# Patient Record
Sex: FEMALE | Race: OTHER | URBAN - METROPOLITAN AREA
[De-identification: names, ages, dates, MRNs, and addresses within clinical notes are randomized per-mention and may not be internally consistent; named-entity substitution may affect disease eponyms.]

---

## 2019-08-28 ENCOUNTER — APPOINTMENT (RX ONLY)
Dept: URBAN - METROPOLITAN AREA CLINIC 45 | Facility: CLINIC | Age: 64
Setting detail: DERMATOLOGY
End: 2019-08-28

## 2019-08-28 DIAGNOSIS — L71.8 OTHER ROSACEA: ICD-10-CM | Status: INADEQUATELY CONTROLLED

## 2019-08-28 PROCEDURE — ? PRESCRIPTION

## 2019-08-28 PROCEDURE — ? TREATMENT REGIMEN

## 2019-08-28 PROCEDURE — ? COUNSELING

## 2019-08-28 PROCEDURE — 99202 OFFICE O/P NEW SF 15 MIN: CPT

## 2019-08-28 PROCEDURE — ? INVENTORY

## 2019-08-28 RX ORDER — IVERMECTIN 10 MG/G
CREAM TOPICAL QAM
Qty: 1 | Refills: 3 | Status: ERX | COMMUNITY
Start: 2019-08-28

## 2019-08-28 RX ADMIN — IVERMECTIN: 10 CREAM TOPICAL at 14:38

## 2019-08-28 ASSESSMENT — LOCATION SIMPLE DESCRIPTION DERM
LOCATION SIMPLE: LEFT CHEEK
LOCATION SIMPLE: RIGHT CHEEK

## 2019-08-28 ASSESSMENT — LOCATION DETAILED DESCRIPTION DERM
LOCATION DETAILED: RIGHT CENTRAL MALAR CHEEK
LOCATION DETAILED: LEFT CENTRAL MALAR CHEEK

## 2019-08-28 ASSESSMENT — LOCATION ZONE DERM: LOCATION ZONE: FACE

## 2019-08-28 NOTE — HPI: RASH
What Type Of Note Output Would You Prefer (Optional)?: Bullet Format
How Severe Is Your Rash?: mild
Is This A New Presentation, Or A Follow-Up?: Rash
Additional History: New patient.

## 2019-08-28 NOTE — PROCEDURE: TREATMENT REGIMEN
Otc Regimen: Cerave hydrating cleanser QD, Aveeno 24hr skin relief moisturizer or Neutrogena hydroboost body gel (in a pump) for the face QD, Onofre 10% sulfur ointment for the face QHS.
Detail Level: Zone

## 2019-09-03 ENCOUNTER — RX ONLY (OUTPATIENT)
Age: 64
Setting detail: RX ONLY
End: 2019-09-03

## 2019-09-03 RX ORDER — METRONIDAZOLE 7.5 MG/G
1 CREAM TOPICAL QDAY
Qty: 1 | Refills: 3 | Status: ERX | COMMUNITY
Start: 2019-09-03

## 2020-06-03 ENCOUNTER — TELEPHONE ENCOUNTER (OUTPATIENT)
Dept: URBAN - METROPOLITAN AREA SURGERY CENTER 13 | Facility: SURGERY CENTER | Age: 65
End: 2020-06-03

## 2020-06-09 ENCOUNTER — OFFICE VISIT (OUTPATIENT)
Dept: URBAN - METROPOLITAN AREA MEDICAL CENTER 24 | Facility: MEDICAL CENTER | Age: 65
End: 2020-06-09
Payer: MEDICARE

## 2020-06-09 DIAGNOSIS — I85.00 ESOPHAGEAL  VARICOSE VEINS: ICD-10-CM

## 2020-06-09 DIAGNOSIS — K29.60 ADENOPAPILLOMATOSIS GASTRICA: ICD-10-CM

## 2020-06-09 PROCEDURE — 43244 EGD VARICES LIGATION: CPT | Performed by: INTERNAL MEDICINE

## 2020-07-06 ENCOUNTER — LAB OUTSIDE AN ENCOUNTER (OUTPATIENT)
Dept: URBAN - METROPOLITAN AREA CLINIC 115 | Facility: CLINIC | Age: 65
End: 2020-07-06

## 2020-07-06 ENCOUNTER — OFFICE VISIT (OUTPATIENT)
Dept: URBAN - METROPOLITAN AREA CLINIC 115 | Facility: CLINIC | Age: 65
End: 2020-07-06
Payer: MEDICARE

## 2020-07-06 DIAGNOSIS — K74.60 UNSPECIFIED CIRRHOSIS OF LIVER: ICD-10-CM

## 2020-07-06 DIAGNOSIS — I85.10 SECONDARY ESOPHAGEAL VARICES WITHOUT BLEEDING: ICD-10-CM

## 2020-07-06 DIAGNOSIS — R18.8 OTHER ASCITES: ICD-10-CM

## 2020-07-06 DIAGNOSIS — R10.84 GENERALIZED ABDOMINAL PAIN: ICD-10-CM

## 2020-07-06 PROBLEM — 19943007: Status: ACTIVE | Noted: 2020-07-06

## 2020-07-06 PROBLEM — 389026000: Status: ACTIVE | Noted: 2020-07-06

## 2020-07-06 PROBLEM — 14223005: Status: ACTIVE | Noted: 2020-07-06

## 2020-07-06 PROCEDURE — 1036F TOBACCO NON-USER: CPT | Performed by: INTERNAL MEDICINE

## 2020-07-06 PROCEDURE — G8417 CALC BMI ABV UP PARAM F/U: HCPCS | Performed by: INTERNAL MEDICINE

## 2020-07-06 PROCEDURE — 99214 OFFICE O/P EST MOD 30 MIN: CPT | Performed by: INTERNAL MEDICINE

## 2020-07-06 RX ORDER — ACETAMINOPHEN ER 650 MG TABLET,EXTENDED RELEASE 650 MG
TAKE 2 TABLETS (1,300 MG) BY ORAL ROUTE EVERY 8 HOURS SWALLOWING WHOLE WITH WATER. DO NOT BREAK, CRUSH, DISSOLVE AND/OR CHEW TABLET, EXTENDED RELEASE ORAL
Qty: 0 | Refills: 0 | Status: ACTIVE | COMMUNITY
Start: 1900-01-01

## 2020-07-06 RX ORDER — DICYCLOMINE HYDROCHLORIDE 10 MG/1
1 TABLET CAPSULE ORAL THREE TIMES A DAY
Qty: 90 | Refills: 1 | OUTPATIENT
Start: 2020-07-06 | End: 2020-09-04

## 2020-07-06 RX ORDER — PANTOPRAZOLE SODIUM 40 MG/1
TAKE 1 TABLET (40 MG) BY ORAL ROUTE ONCE DAILY TABLET, DELAYED RELEASE ORAL 1
Qty: 0 | Refills: 0 | Status: ACTIVE | COMMUNITY
Start: 1900-01-01

## 2020-07-06 RX ORDER — ATORVASTATIN CALCIUM 20 MG/1
TABLET, FILM COATED ORAL
Qty: 0 | Refills: 0 | Status: ACTIVE | COMMUNITY
Start: 1900-01-01

## 2020-07-06 RX ORDER — LOSARTAN POTASSIUM 25 MG/1
TAKE 1 TABLET (25 MG) BY ORAL ROUTE ONCE DAILY TABLET, FILM COATED ORAL 1
Qty: 0 | Refills: 0 | Status: ACTIVE | COMMUNITY
Start: 1900-01-01

## 2020-07-06 RX ORDER — AMLODIPINE BESYLATE 10 MG/1
TABLET ORAL
Qty: 0 | Refills: 0 | Status: ACTIVE | COMMUNITY
Start: 1900-01-01

## 2020-07-06 RX ORDER — SPIRONOLACTONE 50 MG/1
TAKE 1 TABLET (50 MG) BY ORAL ROUTE ONCE DAILY TABLET, FILM COATED ORAL 1
Qty: 0 | Refills: 0 | Status: ACTIVE | COMMUNITY
Start: 1900-01-01

## 2020-07-06 RX ORDER — FUROSEMIDE 20 MG/1
TABLET ORAL
Qty: 0 | Refills: 0 | Status: ACTIVE | COMMUNITY
Start: 1900-01-01

## 2020-07-06 RX ORDER — PANTOPRAZOLE SODIUM 40 MG
TAKE 1 TABLET (40 MG) BY ORAL ROUTE ONCE DAILY FOR 90 DAYS TABLET, DELAYED RELEASE (ENTERIC COATED) ORAL 1
Qty: 90 | Refills: 1 | Status: ACTIVE | COMMUNITY
Start: 2020-03-09 | End: 2020-09-05

## 2020-07-06 NOTE — HPI-TODAY'S VISIT:
65 years old pleasant white female came for follow-up patient is complaining of abdominal pain which is diffuse all over the abdomen.  Denies of any melena hematochezia she is not on any dicyclomine denies of any further diarrhea no fever no chills denies of any leg swelling.  She was also worried about celiac disease.  She was on Lasix and Aldactone currently.  No jaundice no ascites.  She did had a EGD and banding done on 2/27/2020 showed grade 2 esophageal varices which are in 2 columns with portal gastropathy.

## 2020-07-10 LAB
A/G RATIO: 1
ALBUMIN: 3.6
ALKALINE PHOSPHATASE: 161
ALT (SGPT): 58
AST (SGOT): 80
BILIRUBIN, TOTAL: 1
BUN/CREATININE RATIO: 12
BUN: 11
CALCIUM: 9.6
CARBON DIOXIDE, TOTAL: 23
CHLORIDE: 105
CREATININE: 0.95
EGFR IF AFRICN AM: 72
EGFR IF NONAFRICN AM: 63
GLOBULIN, TOTAL: 3.6
GLUCOSE: 139
HEMATOCRIT: 42.7
HEMOGLOBIN: 13.7
MCH: 32.2
MCHC: 32.1
MCV: 100
NRBC: (no result)
PLATELETS: 130
POTASSIUM: 4.8
PROTEIN, TOTAL: 7.2
RBC: 4.26
RDW: 13.8
SODIUM: 145
T-TRANSGLUTAMINASE (TTG) IGA: <2
T-TRANSGLUTAMINASE (TTG) IGG: <2
WBC: 6.2

## 2020-07-22 ENCOUNTER — LAB OUTSIDE AN ENCOUNTER (OUTPATIENT)
Dept: URBAN - METROPOLITAN AREA CLINIC 82 | Facility: CLINIC | Age: 65
End: 2020-07-22

## 2020-07-22 LAB
CREATININE POC: 0.8
PERFORMING LAB: (no result)

## 2020-07-27 ENCOUNTER — ERX REFILL RESPONSE (OUTPATIENT)
Age: 65
End: 2020-07-27

## 2020-07-27 RX ORDER — SPIRONOLACTONE 50 MG/1
TAKE 1 TABLET BY MOUTH ONCE DAILY TABLET ORAL
Qty: 90 | Refills: 1

## 2020-07-27 RX ORDER — SUCRALFATE 1 G/1
TAKE 1 TABLET BY MOUTH TWICE DAILY FOR 90 DAYS TABLET ORAL
Qty: 180 | Refills: 0

## 2020-08-12 ENCOUNTER — TELEPHONE ENCOUNTER (OUTPATIENT)
Dept: URBAN - METROPOLITAN AREA CLINIC 115 | Facility: CLINIC | Age: 65
End: 2020-08-12

## 2020-10-07 ENCOUNTER — ERX REFILL RESPONSE (OUTPATIENT)
Age: 65
End: 2020-10-07

## 2020-10-07 RX ORDER — PANTOPRAZOLE 40 MG/1
TAKE 1 TABLET BY MOUTH ONCE DAILY FOR 90 DAYS TABLET, DELAYED RELEASE ORAL
Qty: 90 | Refills: 0

## 2020-10-09 ENCOUNTER — ERX REFILL RESPONSE (OUTPATIENT)
Age: 65
End: 2020-10-09

## 2020-10-09 RX ORDER — PANTOPRAZOLE 40 MG/1
TAKE 1 TABLET BY MOUTH ONCE DAILY FOR 90 DAYS TABLET, DELAYED RELEASE ORAL
Qty: 90 | Refills: 0

## 2020-10-15 ENCOUNTER — OFFICE VISIT (OUTPATIENT)
Dept: URBAN - METROPOLITAN AREA CLINIC 115 | Facility: CLINIC | Age: 65
End: 2020-10-15

## 2020-10-15 ENCOUNTER — OFFICE VISIT (OUTPATIENT)
Dept: URBAN - METROPOLITAN AREA MEDICAL CENTER 31 | Facility: MEDICAL CENTER | Age: 65
End: 2020-10-15
Payer: MEDICARE

## 2020-10-15 DIAGNOSIS — I85.10 ESOPH VARICE OTHER DIS: ICD-10-CM

## 2020-10-15 DIAGNOSIS — K74.69 CIRRHOSIS, CRYPTOGENIC: ICD-10-CM

## 2020-10-15 DIAGNOSIS — K29.60 ADENOPAPILLOMATOSIS GASTRICA: ICD-10-CM

## 2020-10-15 PROCEDURE — 43239 EGD BIOPSY SINGLE/MULTIPLE: CPT | Performed by: INTERNAL MEDICINE

## 2020-11-12 ENCOUNTER — WEB ENCOUNTER (OUTPATIENT)
Dept: URBAN - METROPOLITAN AREA CLINIC 115 | Facility: CLINIC | Age: 65
End: 2020-11-12

## 2020-11-12 ENCOUNTER — OFFICE VISIT (OUTPATIENT)
Dept: URBAN - METROPOLITAN AREA CLINIC 115 | Facility: CLINIC | Age: 65
End: 2020-11-12
Payer: MEDICARE

## 2020-11-12 DIAGNOSIS — K74.60 CIRRHOSIS OF LIVER: ICD-10-CM

## 2020-11-12 DIAGNOSIS — I85.00 ESOPHAGEAL VARICES: ICD-10-CM

## 2020-11-12 DIAGNOSIS — R19.7 DIARRHEA: ICD-10-CM

## 2020-11-12 DIAGNOSIS — R77.2 ELEVATED AFP: ICD-10-CM

## 2020-11-12 PROCEDURE — G8427 DOCREV CUR MEDS BY ELIG CLIN: HCPCS | Performed by: INTERNAL MEDICINE

## 2020-11-12 PROCEDURE — G8417 CALC BMI ABV UP PARAM F/U: HCPCS | Performed by: INTERNAL MEDICINE

## 2020-11-12 PROCEDURE — 99214 OFFICE O/P EST MOD 30 MIN: CPT | Performed by: INTERNAL MEDICINE

## 2020-11-12 PROCEDURE — 1036F TOBACCO NON-USER: CPT | Performed by: INTERNAL MEDICINE

## 2020-11-12 RX ORDER — SUCRALFATE 1 G/1
TAKE 1 TABLET BY MOUTH TWICE DAILY FOR 90 DAYS TABLET ORAL
Qty: 180 | Refills: 0 | COMMUNITY

## 2020-11-12 RX ORDER — FUROSEMIDE 20 MG/1
TABLET ORAL
Qty: 0 | Refills: 0 | COMMUNITY
Start: 1900-01-01

## 2020-11-12 RX ORDER — PANTOPRAZOLE 40 MG/1
TAKE 1 TABLET BY MOUTH ONCE DAILY FOR 90 DAYS TABLET, DELAYED RELEASE ORAL
Qty: 90 | Refills: 0 | COMMUNITY

## 2020-11-12 RX ORDER — ACETAMINOPHEN ER 650 MG TABLET,EXTENDED RELEASE 650 MG
TAKE 2 TABLETS (1,300 MG) BY ORAL ROUTE EVERY 8 HOURS SWALLOWING WHOLE WITH WATER. DO NOT BREAK, CRUSH, DISSOLVE AND/OR CHEW TABLET, EXTENDED RELEASE ORAL
Qty: 0 | Refills: 0 | COMMUNITY
Start: 1900-01-01

## 2020-11-12 RX ORDER — ATORVASTATIN CALCIUM 20 MG/1
TABLET, FILM COATED ORAL
Qty: 0 | Refills: 0 | COMMUNITY
Start: 1900-01-01

## 2020-11-12 RX ORDER — AMLODIPINE BESYLATE 10 MG/1
TABLET ORAL
Qty: 0 | Refills: 0 | COMMUNITY
Start: 1900-01-01

## 2020-11-12 RX ORDER — SPIRONOLACTONE 50 MG/1
TAKE 1 TABLET BY MOUTH ONCE DAILY TABLET ORAL
Qty: 90 | Refills: 1 | COMMUNITY

## 2020-11-12 RX ORDER — LOSARTAN POTASSIUM 25 MG/1
TAKE 1 TABLET (25 MG) BY ORAL ROUTE ONCE DAILY TABLET, FILM COATED ORAL 1
Qty: 0 | Refills: 0 | COMMUNITY
Start: 1900-01-01

## 2020-11-12 NOTE — HPI-TODAY'S VISIT:
07/06/2020 65 years old pleasant white female came for follow-up patient is complaining of abdominal pain which is diffuse all over the abdomen.  Denies of any melena hematochezia she is not on any dicyclomine denies of any further diarrhea no fever no chills denies of any leg swelling.  She was also worried about celiac disease.  She was on Lasix and Aldactone currently.  No jaundice no ascites.  She did had a EGD and banding done on 2/27/2020 showed grade 2 esophageal varices which are in 2 columns with portal gastropathy.   11/12/2020 Patient presents for a follow up office visit. Patient has continued symptoms of diarrhea and bloating occasionally. EGD on 10/15/2020 showed grade 1 esoophageal varices and portal gastropathy. She had CT scan done this year June 2020. Denies any jaundice, ascites or pedal edema.

## 2020-11-12 NOTE — HPI-OTHER HISTORIES
The patient is a 64 year old female, who presents on referral from Jey Stoll MD, for a gastroenterology evaluation for cirrhosis of the liver. A copy of this document will be sent to the referring provider. The patient has been experiencing these symptoms for 6 months.  Diagnostic testing has not been done.  No related laboratory studies have been done recently.       6/5/2019 CT done on 5/24/2019 showed cirrhosis of the liver, esophageal varices present. Patient states this is the first time she was found to have cirrhosis. She had a previous cholecystectomy. She states she visited her PCP for diarrhea. She denies alcohol consumption. Patient's daughter has cirrhosis due to CHILDS. She is currently off of cholesterol medication. She complains of abdominal pain and soreness. She had a hepatitis panel done which was negative. She denies any melena or hematochezia.  8/14/2019 EGD done on 7/18/2019 showed esophageal varices 3 column, s/p band ligation and gastritis of the antrum of the stomach. Biopsy of the stomach did not show any H pylori bacteria. Liver biopsy done on 7/25/2019 showed cirrhosis of the liver, etiology unknown. MRI on 7/29/2019 showed cirrhosis with stigmata of portal hypertension including splenomegaly, moderate periesophageal varices and tiny mesenteric and perisplenic varices, as well as perihepatic ascites, no evidence of hepatocellular carcinoma.  Patient states she is doing better. She has occasional abdominal pain and leg swelling. She admits to seldom use of alcohol in the past. She states the banding of esophageal varices was painful. She does admit to scoliosis in the back and hips. She complains of bloating and gas pain. She does admit to diarrhea, with 6-7 loose bowels a day. Denies any melena or hematochezia.  10/30/2019 Colonoscopy on 10/4/2019 showed one 10 mm polyp in the ascending colon. 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  Patient still complains of diarrhea, with 4-8 loose bowels a day. She states she was seen by Dr. Fuchs, and was put on Nadolol. She states she did not notice any improvement on Bentyl. She has not seen [facility] for evaluation of liver transplant. Denies any pedal edema. She complains bloating. She is currently on Lasix and aldactone.  11/27/2019 EGD done on 11/7/2019 showed 3 column, grade 2 esophageal varices, s/p placement of 7 bands. Biopsy showed gastritis, no H pylori infection noted.  Patient states she is feeling better. She denies any issues after EGD. She is currently on Nadolol. She states Levsin helped her with IBS, abdominal pain improved. She has not been taking the Questran powder. She states she saw her PCP 2 weeks ago, and her blood pressure has been under control. She denies any jaundice, ascites or pedal edema.  03/18/2020 EGD w/ banding done on 02/27/2020 showed grade 2 esophageal varices in 2 columns and portal gastropathy also noted. Biopsy showed gastritis, no H pylori.  Patient states she has been having symptoms of constipation. She continues to take Nadolol 20mg. She denies any pedal edema. She does complain of stomach swelling. She denies any melena or hematochezia. She continues to take Aldactone and Lasix. Patient states she has been having trouble eating. She complains of food catching up. She has had bloating. She denies any food allergies.

## 2020-11-13 LAB
A/G RATIO: 1.2
AFP, SERUM, TUMOR MARKER: 7.8
ALBUMIN: 3.9
ALKALINE PHOSPHATASE: 229
ALT (SGPT): 39
AST (SGOT): 63
BILIRUBIN, TOTAL: 1.2
BUN/CREATININE RATIO: 12
BUN: 9
CALCIUM: 9.2
CARBON DIOXIDE, TOTAL: 24
CHLORIDE: 106
CREATININE: 0.78
EGFR IF AFRICN AM: 92
EGFR IF NONAFRICN AM: 79
GLOBULIN, TOTAL: 3.3
GLUCOSE: 97
HEMATOCRIT: 40.5
HEMOGLOBIN: 13.3
INR: 1.2
MCH: 32.3
MCHC: 32.8
MCV: 98
NRBC: (no result)
PLATELETS: 155
POTASSIUM: 4.5
PROTEIN, TOTAL: 7.2
PROTHROMBIN TIME: 13
RBC: 4.12
RDW: 13.5
SODIUM: 141
WBC: 5

## 2020-12-16 ENCOUNTER — ERX REFILL RESPONSE (OUTPATIENT)
Age: 65
End: 2020-12-16

## 2020-12-16 RX ORDER — NADOLOL 20 MG/1
TAKE 1 TABLET BY MOUTH ONCE DAILY TABLET ORAL
Qty: 30 | Refills: 0

## 2020-12-29 ENCOUNTER — TELEPHONE ENCOUNTER (OUTPATIENT)
Dept: URBAN - METROPOLITAN AREA CLINIC 115 | Facility: CLINIC | Age: 65
End: 2020-12-29

## 2021-01-26 ENCOUNTER — TELEPHONE ENCOUNTER (OUTPATIENT)
Dept: URBAN - METROPOLITAN AREA CLINIC 115 | Facility: CLINIC | Age: 66
End: 2021-01-26

## 2021-02-11 ENCOUNTER — TELEPHONE ENCOUNTER (OUTPATIENT)
Dept: URBAN - METROPOLITAN AREA CLINIC 115 | Facility: CLINIC | Age: 66
End: 2021-02-11

## 2021-02-11 ENCOUNTER — ERX REFILL RESPONSE (OUTPATIENT)
Age: 66
End: 2021-02-11

## 2021-02-11 ENCOUNTER — OFFICE VISIT (OUTPATIENT)
Dept: URBAN - METROPOLITAN AREA CLINIC 115 | Facility: CLINIC | Age: 66
End: 2021-02-11
Payer: MEDICARE

## 2021-02-11 DIAGNOSIS — R77.2 ELEVATED AFP: ICD-10-CM

## 2021-02-11 DIAGNOSIS — I85.00 ESOPHAGEAL VARICES: ICD-10-CM

## 2021-02-11 DIAGNOSIS — K74.60 CIRRHOSIS OF LIVER: ICD-10-CM

## 2021-02-11 DIAGNOSIS — R19.7 DIARRHEA: ICD-10-CM

## 2021-02-11 PROCEDURE — G9903 PT SCRN TBCO ID AS NON USER: HCPCS | Performed by: INTERNAL MEDICINE

## 2021-02-11 PROCEDURE — G8427 DOCREV CUR MEDS BY ELIG CLIN: HCPCS | Performed by: INTERNAL MEDICINE

## 2021-02-11 PROCEDURE — 99214 OFFICE O/P EST MOD 30 MIN: CPT | Performed by: INTERNAL MEDICINE

## 2021-02-11 PROCEDURE — G8417 CALC BMI ABV UP PARAM F/U: HCPCS | Performed by: INTERNAL MEDICINE

## 2021-02-11 RX ORDER — FUROSEMIDE 20 MG/1
1 TABLET TABLET ORAL ONCE A DAY
Qty: 90 | Refills: 1 | OUTPATIENT
Start: 2021-02-11

## 2021-02-11 RX ORDER — ACETAMINOPHEN ER 650 MG TABLET,EXTENDED RELEASE 650 MG
TAKE 2 TABLETS (1,300 MG) BY ORAL ROUTE EVERY 8 HOURS SWALLOWING WHOLE WITH WATER. DO NOT BREAK, CRUSH, DISSOLVE AND/OR CHEW TABLET, EXTENDED RELEASE ORAL
Qty: 0 | Refills: 0 | Status: ACTIVE | COMMUNITY
Start: 1900-01-01

## 2021-02-11 RX ORDER — HYOSCYAMINE SULFATE 0.12 MG/1
TAKE 1 TABLET BY MOUTH THREE TIMES DAILY TABLET ORAL
Qty: 75 | Refills: 0

## 2021-02-11 RX ORDER — PANTOPRAZOLE SODIUM 40 MG/1
1 TABLET TABLET, DELAYED RELEASE ORAL ONCE A DAY
Qty: 90 | Refills: 1 | OUTPATIENT
Start: 2021-02-11

## 2021-02-11 RX ORDER — SUCRALFATE 1 G/1
TAKE 1 TABLET BY MOUTH TWICE DAILY FOR 90 DAYS TABLET ORAL
Qty: 180 | Refills: 0 | Status: DISCONTINUED | COMMUNITY

## 2021-02-11 RX ORDER — CHOLESTYRAMINE 4 G/9G
1 PACKET MIXED WITH WATER OR NON-CARBONATED DRINK POWDER, FOR SUSPENSION ORAL TWICE A DAY
Qty: 60 | Refills: 1 | OUTPATIENT
Start: 2021-02-11

## 2021-02-11 RX ORDER — PANTOPRAZOLE 40 MG/1
TAKE 1 TABLET BY MOUTH ONCE DAILY FOR 90 DAYS TABLET, DELAYED RELEASE ORAL
Qty: 90 | Refills: 0 | Status: ACTIVE | COMMUNITY

## 2021-02-11 RX ORDER — AMLODIPINE BESYLATE 10 MG/1
TABLET ORAL
Qty: 0 | Refills: 0 | Status: ACTIVE | COMMUNITY
Start: 1900-01-01

## 2021-02-11 RX ORDER — FUROSEMIDE 20 MG/1
TABLET ORAL
Qty: 0 | Refills: 0 | Status: ACTIVE | COMMUNITY
Start: 1900-01-01

## 2021-02-11 RX ORDER — SPIRONOLACTONE 50 MG/1
TAKE 1 TABLET BY MOUTH ONCE DAILY TABLET ORAL
Qty: 90 | Refills: 1 | Status: ACTIVE | COMMUNITY

## 2021-02-11 RX ORDER — SODIUM, POTASSIUM,MAG SULFATES 17.5-3.13G
354 ML SOLUTION, RECONSTITUTED, ORAL ORAL
Qty: 354 ML | Refills: 0 | OUTPATIENT
Start: 2021-02-11 | End: 2021-02-12

## 2021-02-11 RX ORDER — LOSARTAN POTASSIUM 25 MG/1
TAKE 1 TABLET (25 MG) BY ORAL ROUTE ONCE DAILY TABLET, FILM COATED ORAL 1
Qty: 0 | Refills: 0 | Status: DISCONTINUED | COMMUNITY
Start: 1900-01-01

## 2021-02-11 RX ORDER — NADOLOL 20 MG/1
TAKE 1 TABLET BY MOUTH ONCE DAILY TABLET ORAL
Qty: 30 | Refills: 0 | Status: ACTIVE | COMMUNITY

## 2021-02-11 RX ORDER — ATORVASTATIN CALCIUM 20 MG/1
TABLET, FILM COATED ORAL
Qty: 0 | Refills: 0 | Status: ACTIVE | COMMUNITY
Start: 1900-01-01

## 2021-02-11 NOTE — HPI-TODAY'S VISIT:
07/06/2020 65 years old pleasant white female came for follow-up patient is complaining of abdominal pain which is diffuse all over the abdomen.  Denies of any melena hematochezia she is not on any dicyclomine denies of any further diarrhea no fever no chills denies of any leg swelling.  She was also worried about celiac disease.  She was on Lasix and Aldactone currently.  No jaundice no ascites.  She did had a EGD and banding done on 2/27/2020 showed grade 2 esophageal varices which are in 2 columns with portal gastropathy.   11/12/2020 Patient presents for a follow up office visit. Patient has continued symptoms of diarrhea and bloating occasionally. EGD on 10/15/2020 showed grade 1 esoophageal varices and portal gastropathy. She had CT scan done this year June 2020. Denies any jaundice, ascites or pedal edema.   02/11/2021 Patient presents for a f/u office visit. Labs on 11/12/2020 showed AFP is normal, liver enzymes are still high. Patient is not anemic. EGD on 10/15/2020 showed grade 1 esoophageal varices. Patient complains of diarrhea occurring 3-5 times a day. She also reports bloating and discomfort in her abdomen, leading to a pressure and tightness feeling throughout the day. Previous physical and labs were done with her PCP Dr. Stoll. A1C, kidney, and thyroid was normal. She avoids alcohol and dairy in her diet.

## 2021-02-11 NOTE — HPI-OTHER HISTORIES
The patient is a 64 year old female, who presents on referral from Jey Stoll MD, for a gastroenterology evaluation for cirrhosis of the liver. A copy of this document will be sent to the referring provider. The patient has been experiencing these symptoms for 6 months.  Diagnostic testing has not been done.  No related laboratory studies have been done recently.       6/5/2019 CT done on 5/24/2019 showed cirrhosis of the liver, esophageal varices present. Patient states this is the first time she was found to have cirrhosis. She had a previous cholecystectomy. She states she visited her PCP for diarrhea. She denies alcohol consumption. Patient's daughter has cirrhosis due to CHILDS. She is currently off of cholesterol medication. She complains of abdominal pain and soreness. She had a hepatitis panel done which was negative. She denies any melena or hematochezia.  8/14/2019 EGD done on 7/18/2019 showed esophageal varices 3 column, s/p band ligation and gastritis of the antrum of the stomach. Biopsy of the stomach did not show any H pylori bacteria. Liver biopsy done on 7/25/2019 showed cirrhosis of the liver, etiology unknown. MRI on 7/29/2019 showed cirrhosis with stigmata of portal hypertension including splenomegaly, moderate periesophageal varices and tiny mesenteric and perisplenic varices, as well as perihepatic ascites, no evidence of hepatocellular carcinoma.  Patient states she is doing better. She has occasional abdominal pain and leg swelling. She admits to seldom use of alcohol in the past. She states the banding of esophageal varices was painful. She does admit to scoliosis in the back and hips. She complains of bloating and gas pain. She does admit to diarrhea, with 6-7 loose bowels a day. Denies any melena or hematochezia.  10/30/2019 Colonoscopy on 10/4/2019 showed one 10 mm polyp in the ascending colon. 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  Patient still complains of diarrhea, with 4-8 loose bowels a day. She states she was seen by Dr. Fuchs, and was put on Nadolol. She states she did not notice any improvement on Bentyl. She has not seen [facility] for evaluation of liver transplant. Denies any pedal edema. She complains bloating. She is currently on Lasix and aldactone.  11/27/2019 EGD done on 11/7/2019 showed 3 column, grade 2 esophageal varices, s/p placement of 7 bands. Biopsy showed gastritis, no H pylori infection noted.  Patient states she is feeling better. She denies any issues after EGD. She is currently on Nadolol. She states Levsin helped her with IBS, abdominal pain improved. She has not been taking the Questran powder. She states she saw her PCP 2 weeks ago, and her blood pressure has been under control. She denies any jaundice, ascites or pedal edema.  03/18/2020 EGD w/ banding done on 02/27/2020 showed grade 2 esophageal varices in 2 columns and portal gastropathy also noted. Biopsy showed gastritis, no H pylori.  Patient states she has been having symptoms of constipation. She continues to take Nadolol 20mg. She denies any pedal edema. She does complain of stomach swelling. She denies any melena or hematochezia. She continues to take Aldactone and Lasix. Patient states she has been having trouble eating. She complains of food catching up. She has had bloating. She denies any food allergies.

## 2021-02-12 LAB
A/G RATIO: 0.8
AFP, SERUM, TUMOR MARKER: 5.2
ALBUMIN: 3.4
ALKALINE PHOSPHATASE: 366
ALT (SGPT): 37
AST (SGOT): 73
BILIRUBIN, TOTAL: 1.2
BUN/CREATININE RATIO: 14
BUN: 14
CALCIUM: 9.1
CARBON DIOXIDE, TOTAL: 24
CHLORIDE: 104
CREATININE: 1
EGFR IF AFRICN AM: 68
EGFR IF NONAFRICN AM: 59
GLOBULIN, TOTAL: 4.2
GLUCOSE: 81
POTASSIUM: 4.5
PROTEIN, TOTAL: 7.6
SODIUM: 141

## 2021-02-24 ENCOUNTER — OFFICE VISIT (OUTPATIENT)
Dept: URBAN - METROPOLITAN AREA SURGERY CENTER 13 | Facility: SURGERY CENTER | Age: 66
End: 2021-02-24
Payer: MEDICARE

## 2021-02-24 ENCOUNTER — CLAIMS CREATED FROM THE CLAIM WINDOW (OUTPATIENT)
Dept: URBAN - METROPOLITAN AREA CLINIC 4 | Facility: CLINIC | Age: 66
End: 2021-02-24
Payer: MEDICARE

## 2021-02-24 DIAGNOSIS — D12.3 BENIGN NEOPLASM OF TRANSVERSE COLON: ICD-10-CM

## 2021-02-24 DIAGNOSIS — D12.2 BENIGN NEOPLASM OF ASCENDING COLON: ICD-10-CM

## 2021-02-24 DIAGNOSIS — D12.3 ADENOMA OF TRANSVERSE COLON: ICD-10-CM

## 2021-02-24 DIAGNOSIS — Z86.010 H/O ADENOMATOUS POLYP OF COLON: ICD-10-CM

## 2021-02-24 DIAGNOSIS — D12.2 ADENOMA OF ASCENDING COLON: ICD-10-CM

## 2021-02-24 PROCEDURE — 45385 COLONOSCOPY W/LESION REMOVAL: CPT | Performed by: INTERNAL MEDICINE

## 2021-02-24 PROCEDURE — 45380 COLONOSCOPY AND BIOPSY: CPT | Performed by: INTERNAL MEDICINE

## 2021-02-24 PROCEDURE — 88305 TISSUE EXAM BY PATHOLOGIST: CPT | Performed by: PATHOLOGY

## 2021-02-24 PROCEDURE — G8907 PT DOC NO EVENTS ON DISCHARG: HCPCS | Performed by: INTERNAL MEDICINE

## 2021-03-04 ENCOUNTER — LAB OUTSIDE AN ENCOUNTER (OUTPATIENT)
Dept: URBAN - METROPOLITAN AREA CLINIC 115 | Facility: CLINIC | Age: 66
End: 2021-03-04

## 2021-03-04 ENCOUNTER — OFFICE VISIT (OUTPATIENT)
Dept: URBAN - METROPOLITAN AREA CLINIC 115 | Facility: CLINIC | Age: 66
End: 2021-03-04
Payer: MEDICARE

## 2021-03-04 DIAGNOSIS — I85.00 ESOPHAGEAL VARICES: ICD-10-CM

## 2021-03-04 DIAGNOSIS — R77.2 ELEVATED AFP: ICD-10-CM

## 2021-03-04 DIAGNOSIS — K74.60 CIRRHOSIS OF LIVER: ICD-10-CM

## 2021-03-04 DIAGNOSIS — R18.8 ASCITES: ICD-10-CM

## 2021-03-04 DIAGNOSIS — R19.7 DIARRHEA: ICD-10-CM

## 2021-03-04 PROCEDURE — G8427 DOCREV CUR MEDS BY ELIG CLIN: HCPCS | Performed by: INTERNAL MEDICINE

## 2021-03-04 PROCEDURE — G8417 CALC BMI ABV UP PARAM F/U: HCPCS | Performed by: INTERNAL MEDICINE

## 2021-03-04 PROCEDURE — G9903 PT SCRN TBCO ID AS NON USER: HCPCS | Performed by: INTERNAL MEDICINE

## 2021-03-04 PROCEDURE — 99214 OFFICE O/P EST MOD 30 MIN: CPT | Performed by: INTERNAL MEDICINE

## 2021-03-04 RX ORDER — CHOLESTYRAMINE 4 G/9G
1 PACKET MIXED WITH WATER OR NON-CARBONATED DRINK POWDER, FOR SUSPENSION ORAL TWICE A DAY
Qty: 60 | Refills: 1 | Status: ACTIVE | COMMUNITY
Start: 2021-02-11

## 2021-03-04 RX ORDER — HYOSCYAMINE SULFATE 0.12 MG/1
TAKE 1 TABLET BY MOUTH THREE TIMES DAILY TABLET ORAL
Qty: 75 | Refills: 0 | Status: ACTIVE | COMMUNITY

## 2021-03-04 RX ORDER — PANTOPRAZOLE SODIUM 40 MG/1
1 TABLET TABLET, DELAYED RELEASE ORAL ONCE A DAY
Qty: 90 | Refills: 1 | Status: ACTIVE | COMMUNITY
Start: 2021-02-11

## 2021-03-04 RX ORDER — ATORVASTATIN CALCIUM 20 MG/1
TABLET, FILM COATED ORAL
Qty: 0 | Refills: 0 | Status: ACTIVE | COMMUNITY
Start: 1900-01-01

## 2021-03-04 RX ORDER — ACETAMINOPHEN ER 650 MG TABLET,EXTENDED RELEASE 650 MG
TAKE 2 TABLETS (1,300 MG) BY ORAL ROUTE EVERY 8 HOURS SWALLOWING WHOLE WITH WATER. DO NOT BREAK, CRUSH, DISSOLVE AND/OR CHEW TABLET, EXTENDED RELEASE ORAL
Qty: 0 | Refills: 0 | Status: ACTIVE | COMMUNITY
Start: 1900-01-01

## 2021-03-04 RX ORDER — PANTOPRAZOLE 40 MG/1
TAKE 1 TABLET BY MOUTH ONCE DAILY FOR 90 DAYS TABLET, DELAYED RELEASE ORAL
Qty: 90 | Refills: 0 | Status: ACTIVE | COMMUNITY

## 2021-03-04 RX ORDER — FUROSEMIDE 20 MG/1
TABLET ORAL
Qty: 0 | Refills: 0 | Status: ACTIVE | COMMUNITY
Start: 1900-01-01

## 2021-03-04 RX ORDER — AMLODIPINE BESYLATE 10 MG/1
TABLET ORAL
Qty: 0 | Refills: 0 | Status: ACTIVE | COMMUNITY
Start: 1900-01-01

## 2021-03-04 RX ORDER — NADOLOL 20 MG/1
TAKE 1 TABLET BY MOUTH ONCE DAILY TABLET ORAL
Qty: 30 | Refills: 0 | Status: ACTIVE | COMMUNITY

## 2021-03-04 RX ORDER — SPIRONOLACTONE 50 MG/1
TAKE 1 TABLET BY MOUTH ONCE DAILY TABLET ORAL
Qty: 90 | Refills: 1 | Status: ACTIVE | COMMUNITY

## 2021-03-04 RX ORDER — FUROSEMIDE 20 MG/1
1 TABLET TABLET ORAL ONCE A DAY
Qty: 90 | Refills: 1 | Status: ACTIVE | COMMUNITY
Start: 2021-02-11

## 2021-03-04 NOTE — HPI-OTHER HISTORIES
The patient is a 64 year old female, who presents on referral from Jey Stoll MD, for a gastroenterology evaluation for cirrhosis of the liver. A copy of this document will be sent to the referring provider. The patient has been experiencing these symptoms for 6 months.  Diagnostic testing has not been done.  No related laboratory studies have been done recently.       6/5/2019 CT done on 5/24/2019 showed cirrhosis of the liver, esophageal varices present. Patient states this is the first time she was found to have cirrhosis. She had a previous cholecystectomy. She states she visited her PCP for diarrhea. She denies alcohol consumption. Patient's daughter has cirrhosis due to CHILDS. She is currently off of cholesterol medication. She complains of abdominal pain and soreness. She had a hepatitis panel done which was negative. She denies any melena or hematochezia.  8/14/2019 EGD done on 7/18/2019 showed esophageal varices 3 column, s/p band ligation and gastritis of the antrum of the stomach. Biopsy of the stomach did not show any H pylori bacteria. Liver biopsy done on 7/25/2019 showed cirrhosis of the liver, etiology unknown. MRI on 7/29/2019 showed cirrhosis with stigmata of portal hypertension including splenomegaly, moderate periesophageal varices and tiny mesenteric and perisplenic varices, as well as perihepatic ascites, no evidence of hepatocellular carcinoma.  Patient states she is doing better. She has occasional abdominal pain and leg swelling. She admits to seldom use of alcohol in the past. She states the banding of esophageal varices was painful. She does admit to scoliosis in the back and hips. She complains of bloating and gas pain. She does admit to diarrhea, with 6-7 loose bowels a day. Denies any melena or hematochezia.  10/30/2019 Colonoscopy on 10/4/2019 showed one 10 mm polyp in the ascending colon. 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  Patient still complains of diarrhea, with 4-8 loose bowels a day. She states she was seen by Dr. Fuchs, and was put on Nadolol. She states she did not notice any improvement on Bentyl. She has not seen [facility] for evaluation of liver transplant. Denies any pedal edema. She complains bloating. She is currently on Lasix and aldactone.  11/27/2019 EGD done on 11/7/2019 showed 3 column, grade 2 esophageal varices, s/p placement of 7 bands. Biopsy showed gastritis, no H pylori infection noted.  Patient states she is feeling better. She denies any issues after EGD. She is currently on Nadolol. She states Levsin helped her with IBS, abdominal pain improved. She has not been taking the Questran powder. She states she saw her PCP 2 weeks ago, and her blood pressure has been under control. She denies any jaundice, ascites or pedal edema.  03/18/2020 EGD w/ banding done on 02/27/2020 showed grade 2 esophageal varices in 2 columns and portal gastropathy also noted. Biopsy showed gastritis, no H pylori.  Patient states she has been having symptoms of constipation. She continues to take Nadolol 20mg. She denies any pedal edema. She does complain of stomach swelling. She denies any melena or hematochezia. She continues to take Aldactone and Lasix. Patient states she has been having trouble eating. She complains of food catching up. She has had bloating. She denies any food allergies.

## 2021-03-05 ENCOUNTER — TELEPHONE ENCOUNTER (OUTPATIENT)
Dept: URBAN - METROPOLITAN AREA CLINIC 115 | Facility: CLINIC | Age: 66
End: 2021-03-05

## 2021-03-05 LAB
A/G RATIO: 0.8
ALBUMIN: 3.3
ALKALINE PHOSPHATASE: 339
ALT (SGPT): 32
AST (SGOT): 58
BASO (ABSOLUTE): 0.1
BASOS: 1
BILIRUBIN, TOTAL: 1.1
BUN/CREATININE RATIO: 14
BUN: 14
CALCIUM: 9.1
CARBON DIOXIDE, TOTAL: 24
CHLORIDE: 105
CREATININE: 1
EGFR IF AFRICN AM: 68
EGFR IF NONAFRICN AM: 59
EOS (ABSOLUTE): 0.2
EOS: 3
GLOBULIN, TOTAL: 4
GLUCOSE: 115
HEMATOCRIT: 42.9
HEMATOLOGY COMMENTS:: (no result)
HEMOGLOBIN: 13.3
IMMATURE CELLS: (no result)
IMMATURE GRANS (ABS): 0
IMMATURE GRANULOCYTES: 0
INR: 1.2
LYMPHS (ABSOLUTE): 1.6
LYMPHS: 29
MCH: 29.6
MCHC: 31
MCV: 95
MONOCYTES(ABSOLUTE): 0.6
MONOCYTES: 11
NEUTROPHILS (ABSOLUTE): 3.1
NEUTROPHILS: 56
NRBC: (no result)
PLATELETS: 220
POTASSIUM: 4.1
PROTEIN, TOTAL: 7.3
PROTHROMBIN TIME: 13
RBC: 4.5
RDW: 13.5
SODIUM: 142
WBC: 5.6

## 2021-03-08 ENCOUNTER — TELEPHONE ENCOUNTER (OUTPATIENT)
Dept: URBAN - METROPOLITAN AREA SURGERY CENTER 30 | Facility: SURGERY CENTER | Age: 66
End: 2021-03-08

## 2021-03-09 ENCOUNTER — LAB OUTSIDE AN ENCOUNTER (OUTPATIENT)
Dept: URBAN - METROPOLITAN AREA SURGERY CENTER 30 | Facility: SURGERY CENTER | Age: 66
End: 2021-03-09

## 2021-03-22 ENCOUNTER — TELEPHONE ENCOUNTER (OUTPATIENT)
Dept: URBAN - METROPOLITAN AREA CLINIC 115 | Facility: CLINIC | Age: 66
End: 2021-03-22

## 2021-03-22 ENCOUNTER — ERX REFILL RESPONSE (OUTPATIENT)
Dept: URBAN - METROPOLITAN AREA CLINIC 82 | Facility: CLINIC | Age: 66
End: 2021-03-22

## 2021-03-22 RX ORDER — SPIRONOLACTONE 50 MG/1
TAKE 1 TABLET BY MOUTH ONCE DAILY TABLET ORAL
Qty: 90 | Refills: 0

## 2021-03-25 ENCOUNTER — TELEPHONE ENCOUNTER (OUTPATIENT)
Dept: URBAN - METROPOLITAN AREA CLINIC 92 | Facility: CLINIC | Age: 66
End: 2021-03-25

## 2021-04-05 ENCOUNTER — DASHBOARD ENCOUNTERS (OUTPATIENT)
Age: 66
End: 2021-04-05

## 2021-04-08 ENCOUNTER — OFFICE VISIT (OUTPATIENT)
Dept: URBAN - METROPOLITAN AREA CLINIC 115 | Facility: CLINIC | Age: 66
End: 2021-04-08
Payer: MEDICARE

## 2021-04-08 ENCOUNTER — LAB OUTSIDE AN ENCOUNTER (OUTPATIENT)
Dept: URBAN - METROPOLITAN AREA CLINIC 115 | Facility: CLINIC | Age: 66
End: 2021-04-08

## 2021-04-08 DIAGNOSIS — R77.2 ELEVATED AFP: ICD-10-CM

## 2021-04-08 DIAGNOSIS — K74.60 CIRRHOSIS OF LIVER: ICD-10-CM

## 2021-04-08 DIAGNOSIS — R19.7 DIARRHEA: ICD-10-CM

## 2021-04-08 DIAGNOSIS — R18.8 ASCITES: ICD-10-CM

## 2021-04-08 DIAGNOSIS — I85.00 ESOPHAGEAL VARICES: ICD-10-CM

## 2021-04-08 PROBLEM — 166561008 ALPHA-FETOPROTEIN RAISED: Status: ACTIVE | Noted: 2021-04-08

## 2021-04-08 PROBLEM — 62315008 DIARRHEA: Status: ACTIVE | Noted: 2021-04-08

## 2021-04-08 PROCEDURE — G8417 CALC BMI ABV UP PARAM F/U: HCPCS | Performed by: INTERNAL MEDICINE

## 2021-04-08 PROCEDURE — G8427 DOCREV CUR MEDS BY ELIG CLIN: HCPCS | Performed by: INTERNAL MEDICINE

## 2021-04-08 PROCEDURE — 99214 OFFICE O/P EST MOD 30 MIN: CPT | Performed by: INTERNAL MEDICINE

## 2021-04-08 PROCEDURE — G9905 NO PT TBCO SCRN RNG: HCPCS | Performed by: INTERNAL MEDICINE

## 2021-04-08 RX ORDER — SPIRONOLACTONE 50 MG/1
3 TABLETS TABLET, FILM COATED ORAL TWICE A DAY
Qty: 180 TABLET | Refills: 1 | OUTPATIENT
Start: 2021-04-08 | End: 2021-06-07

## 2021-04-08 RX ORDER — FUROSEMIDE 40 MG/1
1.5 TABLET TABLET ORAL ONCE A DAY
Qty: 45 TABLET | Refills: 1 | OUTPATIENT
Start: 2021-04-08

## 2021-04-08 RX ORDER — NADOLOL 20 MG/1
TAKE 1 TABLET BY MOUTH ONCE DAILY TABLET ORAL
Qty: 30 | Refills: 0 | Status: ACTIVE | COMMUNITY

## 2021-04-08 RX ORDER — NADOLOL 20 MG/1
2 TABLETS TABLET ORAL ONCE A DAY
Qty: 60 | OUTPATIENT
Start: 2021-04-08

## 2021-04-08 RX ORDER — FUROSEMIDE 20 MG/1
1 TABLET TABLET ORAL ONCE A DAY
Qty: 90 | Refills: 1 | Status: ACTIVE | COMMUNITY
Start: 2021-02-11

## 2021-04-08 RX ORDER — AMLODIPINE BESYLATE 10 MG/1
TABLET ORAL
Qty: 0 | Refills: 0 | Status: ACTIVE | COMMUNITY
Start: 1900-01-01

## 2021-04-08 RX ORDER — HYOSCYAMINE SULFATE 0.12 MG/1
TAKE 1 TABLET BY MOUTH THREE TIMES DAILY TABLET ORAL
Qty: 75 | Refills: 0 | Status: ACTIVE | COMMUNITY

## 2021-04-08 RX ORDER — CHOLESTYRAMINE 4 G/9G
1 PACKET MIXED WITH WATER OR NON-CARBONATED DRINK POWDER, FOR SUSPENSION ORAL TWICE A DAY
Qty: 60 | Refills: 1 | Status: ACTIVE | COMMUNITY
Start: 2021-02-11

## 2021-04-08 RX ORDER — PANTOPRAZOLE SODIUM 40 MG/1
1 TABLET TABLET, DELAYED RELEASE ORAL ONCE A DAY
Qty: 90 | Refills: 1 | Status: ACTIVE | COMMUNITY
Start: 2021-02-11

## 2021-04-08 RX ORDER — ACETAMINOPHEN ER 650 MG TABLET,EXTENDED RELEASE 650 MG
TAKE 2 TABLETS (1,300 MG) BY ORAL ROUTE EVERY 8 HOURS SWALLOWING WHOLE WITH WATER. DO NOT BREAK, CRUSH, DISSOLVE AND/OR CHEW TABLET, EXTENDED RELEASE ORAL
Qty: 0 | Refills: 0 | Status: ACTIVE | COMMUNITY
Start: 1900-01-01

## 2021-04-08 RX ORDER — PANTOPRAZOLE 40 MG/1
TAKE 1 TABLET BY MOUTH ONCE DAILY FOR 90 DAYS TABLET, DELAYED RELEASE ORAL
Qty: 90 | Refills: 0 | Status: ACTIVE | COMMUNITY

## 2021-04-08 RX ORDER — ATORVASTATIN CALCIUM 20 MG/1
TABLET, FILM COATED ORAL
Qty: 0 | Refills: 0 | Status: ACTIVE | COMMUNITY
Start: 1900-01-01

## 2021-04-08 RX ORDER — FUROSEMIDE 20 MG/1
TABLET ORAL
Qty: 0 | Refills: 0 | Status: ACTIVE | COMMUNITY
Start: 1900-01-01

## 2021-04-08 RX ORDER — SPIRONOLACTONE 50 MG/1
TAKE 1 TABLET BY MOUTH ONCE DAILY TABLET ORAL
Qty: 90 | Refills: 0 | Status: ACTIVE | COMMUNITY

## 2021-04-08 NOTE — HPI-OTHER HISTORIES
The patient is a 64 year old female, who presents on referral from Jey Stoll MD, for a gastroenterology evaluation for cirrhosis of the liver. A copy of this document will be sent to the referring provider. The patient has been experiencing these symptoms for 6 months.  Diagnostic testing has not been done.  No related laboratory studies have been done recently.       6/5/2019 CT done on 5/24/2019 showed cirrhosis of the liver, esophageal varices present. Patient states this is the first time she was found to have cirrhosis. She had a previous cholecystectomy. She states she visited her PCP for diarrhea. She denies alcohol consumption. Patient's daughter has cirrhosis due to HCILDS. She is currently off of cholesterol medication. She complains of abdominal pain and soreness. She had a hepatitis panel done which was negative. She denies any melena or hematochezia.  8/14/2019 EGD done on 7/18/2019 showed esophageal varices 3 column, s/p band ligation and gastritis of the antrum of the stomach. Biopsy of the stomach did not show any H pylori bacteria. Liver biopsy done on 7/25/2019 showed cirrhosis of the liver, etiology unknown. MRI on 7/29/2019 showed cirrhosis with stigmata of portal hypertension including splenomegaly, moderate periesophageal varices and tiny mesenteric and perisplenic varices, as well as perihepatic ascites, no evidence of hepatocellular carcinoma.  Patient states she is doing better. She has occasional abdominal pain and leg swelling. She admits to seldom use of alcohol in the past. She states the banding of esophageal varices was painful. She does admit to scoliosis in the back and hips. She complains of bloating and gas pain. She does admit to diarrhea, with 6-7 loose bowels a day. Denies any melena or hematochezia.  10/30/2019 Colonoscopy on 10/4/2019 showed one 10 mm polyp in the ascending colon. 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  Patient still complains of diarrhea, with 4-8 loose bowels a day. She states she was seen by Dr. Fuchs, and was put on Nadolol. She states she did not notice any improvement on Bentyl. She has not seen [facility] for evaluation of liver transplant. Denies any pedal edema. She complains bloating. She is currently on Lasix and aldactone.  11/27/2019 EGD done on 11/7/2019 showed 3 column, grade 2 esophageal varices, s/p placement of 7 bands. Biopsy showed gastritis, no H pylori infection noted.  Patient states she is feeling better. She denies any issues after EGD. She is currently on Nadolol. She states Levsin helped her with IBS, abdominal pain improved. She has not been taking the Questran powder. She states she saw her PCP 2 weeks ago, and her blood pressure has been under control. She denies any jaundice, ascites or pedal edema.  03/18/2020 EGD w/ banding done on 02/27/2020 showed grade 2 esophageal varices in 2 columns and portal gastropathy also noted. Biopsy showed gastritis, no H pylori.  Patient states she has been having symptoms of constipation. She continues to take Nadolol 20mg. She denies any pedal edema. She does complain of stomach swelling. She denies any melena or hematochezia. She continues to take Aldactone and Lasix. Patient states she has been having trouble eating. She complains of food catching up. She has had bloating. She denies any food allergies.
within functional limits

## 2021-04-15 ENCOUNTER — TELEPHONE ENCOUNTER (OUTPATIENT)
Dept: URBAN - METROPOLITAN AREA CLINIC 23 | Facility: CLINIC | Age: 66
End: 2021-04-15

## 2021-04-20 ENCOUNTER — TELEPHONE ENCOUNTER (OUTPATIENT)
Dept: URBAN - METROPOLITAN AREA CLINIC 96 | Facility: CLINIC | Age: 66
End: 2021-04-20

## 2021-05-03 ENCOUNTER — TELEPHONE ENCOUNTER (OUTPATIENT)
Dept: URBAN - METROPOLITAN AREA CLINIC 115 | Facility: CLINIC | Age: 66
End: 2021-05-03

## 2021-05-03 ENCOUNTER — LAB OUTSIDE AN ENCOUNTER (OUTPATIENT)
Dept: URBAN - METROPOLITAN AREA CLINIC 115 | Facility: CLINIC | Age: 66
End: 2021-05-03

## 2021-05-03 PROBLEM — 389026000 ASCITES: Status: ACTIVE | Noted: 2021-04-08

## 2021-06-02 ENCOUNTER — TELEPHONE ENCOUNTER (OUTPATIENT)
Dept: URBAN - METROPOLITAN AREA CLINIC 115 | Facility: CLINIC | Age: 66
End: 2021-06-02

## 2021-06-14 ENCOUNTER — ERX REFILL RESPONSE (OUTPATIENT)
Dept: URBAN - METROPOLITAN AREA CLINIC 82 | Facility: CLINIC | Age: 66
End: 2021-06-14

## 2021-06-14 RX ORDER — HYOSCYAMINE SULFATE 0.12 MG/1
TAKE 1 TABLET BY MOUTH THREE TIMES DAILY TABLET ORAL
Qty: 75 | Refills: 0

## 2021-06-17 ENCOUNTER — TELEPHONE ENCOUNTER (OUTPATIENT)
Dept: URBAN - METROPOLITAN AREA CLINIC 115 | Facility: CLINIC | Age: 66
End: 2021-06-17

## 2021-06-17 RX ORDER — ONDANSETRON HYDROCHLORIDE 8 MG/1
1 TABLET AS NEEDED TABLET, FILM COATED ORAL THREE TIMES A DAY
Qty: 30 | Refills: 1 | OUTPATIENT
Start: 2021-06-18